# Patient Record
(demographics unavailable — no encounter records)

---

## 2024-11-01 NOTE — PHYSICAL EXAM
[Left] : left foot and ankle [NL (40)] : plantar flexion 40 degrees [NL 30)] : inversion 30 degrees [NL (20)] : eversion 20 degrees [5___] : eversion 5[unfilled]/5 [2+] : dorsalis pedis pulse: 2+ [] : no peroneal tendon subluxation with circumduction [TWNoteComboBox7] : dorsiflexion 0 degrees

## 2024-11-01 NOTE — DISCUSSION/SUMMARY
[de-identified] : Patient seen and examined.  Patient presents today with chronic acute on chronic left ankle pain.  Based on history, physical examination, imaging I discussed with her that her symptoms are suggestive of some ankle synovitis with associated talar osteophyte as well as a medial osteochondral lesion of the talus.  Due to the fact the patient has not really had any formal treatment to address this issue I recommended initial treatment of cam boot immobilization and nonsteroidal anti-inflammatories compression sock and plantar fascia night splint to help control ankle position.  I will see her back in approximately 2 weeks time.  Should her symptoms fail to improve or worsen we will maintain the cam boot for another 2 weeks and reassess.  Should her symptoms progress appropriately we will initiate transition to ASO and start physical therapy focusing on functional ankle rehabilitation.  Should her symptoms not improve over the next 6 weeks I will order an MRI to look at the extent of the pathology and soft tissue and cartilage to discuss further treatment options either injection or surgery.  Plan for next visit: 1.  Assess tenderness along anterior anterior lateral gutter syndesmosis as well as subfibular impingement.  Assess ankle instability she is currently has a lax anterior drawer.  Consider transitioning out of cam boot if symptoms are improving consider maintaining cam boot if they are not. **** Patient seen and examined.  Patient presents today following up for acute on chronic recurrent left ankle pain with subfibular impingement.  She is doing well on examination.  Her symptoms seem to be much improved.  Swelling is gone down tenderness is gone down.  Ankle feels more stable on examination.  I discussed with her that we will transition to an ASO, and start functional ankle rehabilitation with no passive or active inversion for the next 4 weeks.  I will plan to see her back in approximately 4 weeks time.  She should continue to use either the night splint or the cam boot at night.  No running cutting jumping or box jumps or leg press over the next 4 weeks.  We did discuss exercises I am comfortable with her doing.  Should her symptoms fail to improve or worsen over the next 4 weeks I will order an MRI to look at the extent of pathology and soft tissue and cartilage.  Did discuss further treatment options either injection or surgery.  Plan for next visit: Assess anterior lateral gutter syndesmosis pain as well as subfibular impingement although are improving today.  Assess ankle drawer, currently improving compared to prior examination.  Consider removing restriction on PT versus advanced imaging depending on her symptoms. *** Patient seen and examined.  Patient presents today following up for acute on chronic recurrent left ankle pain with subfibular impingement.  She is doing well on examination. Pt has improving exam but  in the anterior capsule. She has some mild anterior impingement and OLT of medial talar dome that is not tender on exam. Recommend MR so we can discuss further treatment options in the form of either injections biologic or csi were discussed v further PT and NSAIDs. All questions asked and answered. Pt agrees with plan.  *** Patient seen and examined.  Patient presents today for MRI review.  We discussed with her that on her MRI there is evidence of a medial osteochondral lesion of the talus, there is significant ankle synovitis, there is also attenuation of the anterior lateral bundle of the ATFL.  I discussed with her treatment options that included either a low-dose steroid injection to help calm down synovitis, and a PRP injection to do similarly calm down inflammation and help with regenerative potential of intra-articular ligaments as well as any cartilage pathology.  Additionally we did discuss surgical intervention surgical intervention includes an ankle arthroscopy, extensive debridement, assessment under anesthesia of ankle stability, possible Brostrom versus lateral ligament reconstruction, possible debridement of osteochondral lesion of the talus, harvesting B MAC, harvesting bone marrow autograft, placement of bone marrow autograft into the debrided cyst followed by bio cartilage and fibrin glue sealing it.  I discussed with her the possible ability to do these ALCAR as well if she wanted to start with a less invasive surgery to begin with.  We went over the rehab protocol for all with debridement surgery being weightbearing as tolerated within 2 to 3 weeks versus a Brostrom would be around 4 weeks starting physical therapy and brace followed by bio cartilage which would be approximately 6 weeks of nonweightbearing walking in normal shoe around 3 months.  Patient will consider her options and will plan to follow-up with me either to undergo an injection versus surgical discussion.  All questions were asked and answered.  Patient is agreement with the plan we will plan to follow-up accordingly. *** 3/25 Patient seen and examined.  Patient presents today following up for acute on chronic recurrent left ankle pain with subfibular impingement.  She is doing well on examination. Pt received low dose CSI L eft ankle ( did not want PRP, not interested in surgery at this time) tolerated procedure well Continue PT Continue Mobic RTC 6 weeks  next visit: If no improvement rediscuss sx options ** 5/20 Patient seen and examined.  Patient presents today following up for acute on chronic recurrent left ankle pain with subfibular impingement.  She is doing well on examination. Still endorsing pain along medial gutter, but lateral and anterolateral gutter  She received 95-99% pain improvement from injection but it is wearing off Pt cannot have surgery until end of september-- plan for ankle arthroscopy, debridement, repeat MR prior to surgery to assess change in cartilage defect. d/w pt possible repeat low dose csi in 5-6 weeks to help her be active over summer Continue PT Continue Mobic RTC 6 weeks  next visit: possible low dose csi (95% improvement last time) has to push surgery to fall *** 7/1 Patient seen and examined.  Patient presents today following up for acute on chronic recurrent left ankle pain with subfibular impingement.  She is doing well on examination. Still endorsing pain along medial gutter, but lateral and anterolateral gutter  She received 95-99% pain improvement from injection but it is wearing off Pt cannot have surgery until end of september-- plan for ankle arthroscopy, debridement, repeat MR prior to surgery to assess change in cartilage defect. repeated low dose csi to help her be active over summer, pt tolerated procedure well Continue PT Continue Mobic RTC 6 weeks  next visit: r/b of surgery if no improvement **** 11/1 Patient is here for discussion of timeline for surgery. I discussed with the patient that this surgery is completely elective and therefore the timeline is dependent and was most convenient for them.   I discussed with the patient based on their history, physical examination, imaging that her symptoms are suggestive of a symptomatic osteochondral lesion of the talus and possibly osteochondral lesion of the tibia. I discussed with her that she also has a talar osteophyte that is causing anterior impingement as well as a distal tibia osteophyte.    I discussed with her that due to the fact that she has failed nonoperative management my plan for surgery would be an arthroscopic procedure to debride the ankle joint of the synovitis, debridement of the diseased cartilage, assessment of the subchondral bone, if it is soft and there is evidence of cyst removed cystic contents, harvest BMAC and iliac crest bone graft and placement of bone graft into void from cyst removal, on top of that we I will place bio cartilage as well as fibrin glue to seal the defect. Additionally we discussed debriding and cleaning up margins possible microfracture of either the talus or talus and tibia in addition to what was listed above.    As an adjunct to this we also discussed potentially addressing lateral ligament pathology.   We discussed that that is dependent on her symptoms the day of surgery. We discussed the outline for treatment options include either tenosynovectomy, debridement, tubularization, side-to-side tenodesis with excision of the diseased tendon. I discussed with her at length the risks and benefits of surgery. Those include bleeding, neurovascular injury, injury to SPN, CRPS, potential risk of DVT PE and possible loss of life. Additionally we discussed that any cartilage procedure there is always a risk of secondary surgery needed. I discussed with her postoperative protocol that she will be nonweightbearing for approximately 6 weeks. The first 2 weeks will be in the splint followed by 4 weeks in a boot. As long as the incisions look well-healed at 2 weeks we will initiate range of motion to start at 2 weeks postop. Plan for her to start the crutch wean protocol at week 6 followed by the boot wean protocol at week 8. All questions were asked and answered. I would like to see the patient back the week of surgery so we can go over preop visit 1 more time and discuss all risks and benefits and any outstanding questions that were not covered today. Additionally that visit will serve as her preoperative medical clearance due to the fact that she is otherwise healthy denies any chest pain shortness of breath or taking any other medications that would necessitate formal preoperative clearance.

## 2024-11-01 NOTE — ASSESSMENT
[FreeTextEntry1] :   Problem list: 1.Chronic acute on chronic left ankle pain 2.  Osteochondral lesion of the talus 3.  Synovitis of the ankle 4.  Laxity of anterior lateral ligaments.

## 2024-11-01 NOTE — HISTORY OF PRESENT ILLNESS
[de-identified] : Date of Injury/Onset: Patient is here today for evaluation of left ankle. Patient has a history of aggravated fracture in the left ankle from a decade ago. Patient was placed in a cast and physical therapy but states since the original incident she has never been fully healed. Patient has seen other doctors after that and was told she had arthritis. Patient reports persistent swelling and pain after extended periods of walking. Pain: At Rest:  4-5 With Activity: 7 Mechanism of injury: This is NOT a Work Related Injury being treated under Worker's Compensation. This is NOT an athletic injury occurring associated with an interscholastic or organized sports team. Quality of symptoms: Throbbing pain Improves with: Nothing helps Worse with: Walking, physical activity Prior treatment: Cast, Physical Therapy Prior Imaging: X-RAY years ago Reports Available For Review Today: Out of work/sport: No School/Sport/Position/Occupation: [employer] Personal goal: Additional Information: Patient seen and examined.  Patient presents today for evaluation of left ankle pain.  Is acute on chronic.  She states she has an old injury she was diagnosed with some fracture but she is never received formal treatment no surgery no casting she states that she denies any trauma or injury initially.  She noticed her ankle was swollen after a party but states she cannot remember what happened and does not know what happened.  She states pain is localized to the anterior aspect of the ankle as well as into the lateral gutter.  She states that it is significant interfering with her ability perform ADLs or recreational activities.  She has not been seen or evaluated by other docs had any formal treatment, therapy injections or otherwise.  She is here for further evaluation management.  Denies any significant past medical history of diabetes etc.  1/29/24: Left ankle follow up.  Pt reports she feels better.  She states she does not have daily pain with walking.  She states that she feels like she has not tried to push it however she is noticing an improvement with her swelling and pain.  She is here for further evaluation management.  Denies interval trauma or injury.  02/26/2024 Patient is here for follow up of the Left Ankle, states still having aggravating pain on the Left Ankle and states she describes the ASO brace aggravtes her Left Ankle more. As well states she is doing Physical Therpary for Left Ankle. Inconsistently compliant with night splint / boot. Ran out of Mobic. State PT has flared it up.    03/14/2024: Pt is here today to follow up on left ankle MRI results. Patient reports no changes in her ankle despite continued PT, patient still experiences significant pain. Pt was unable to  the mobic.    03/25/2024: Pt is here today to follow up on left ankle. Patient has continued use of ASO and physical therapy but no changes in her ankle since last visit. Patient is interested in recieving an injection today.  05/20/2024 MOMO 40 year F is here today to follow up on left ankle. Patient reports significant improvement since last visit, especially after CSI.   07/01/2024 MOMO 40 year F is here today for follow up evaluation. Pt states that the prior injection is wearing off but that she definitely feels better than she did prior to having. Pt is unable to get surgery sooner than the fall and would like to have an injection today for pain control purposes since she has multiple trips lined up this summer and cannot take the time off to recover.   11/1/24: Patient is here to follow up on her left ankle. No significant improvemen since last visit. Here for preop discussion.

## 2024-11-01 NOTE — DATA REVIEWED
[FreeTextEntry1] : 3 views left ankle performed at [place]: These films independently interpreted by myself.  They are negative for fracture or dislocation.  There is evidence of some mild osteophyte along the talar neck.  There is evidence of an osteochondral lesion of the talus.  There is adequate joint space appreciated in tibiotalar subtalar joint.  No intra-articular loose bodies appreciated.  MR left ankle: Attenuation of atfl Stable 12x6 mm OLT synovitis

## 2025-01-23 NOTE — PHYSICAL EXAM
[Left] : left foot and ankle [5___] : plantar flexion 5[unfilled]/5 [] : no peroneal tendon tenderness [TWNoteComboBox7] : dorsiflexion 0 degrees [de-identified] : LIMITED ANKLE EXAM: 3 incisions healing well , AM AL portal and lateral incision no evidence of discharge or drainage DF PF 0-40 SILT DP SP T S S able to wiggle toes

## 2025-01-23 NOTE — DISCUSSION/SUMMARY
[de-identified] : Patient seen and examined.  Patient presents today with chronic acute on chronic left ankle pain.  Based on history, physical examination, imaging I discussed with her that her symptoms are suggestive of some ankle synovitis with associated talar osteophyte as well as a medial osteochondral lesion of the talus.  Due to the fact the patient has not really had any formal treatment to address this issue I recommended initial treatment of cam boot immobilization and nonsteroidal anti-inflammatories compression sock and plantar fascia night splint to help control ankle position.  I will see her back in approximately 2 weeks time.  Should her symptoms fail to improve or worsen we will maintain the cam boot for another 2 weeks and reassess.  Should her symptoms progress appropriately we will initiate transition to ASO and start physical therapy focusing on functional ankle rehabilitation.  Should her symptoms not improve over the next 6 weeks I will order an MRI to look at the extent of the pathology and soft tissue and cartilage to discuss further treatment options either injection or surgery.  Plan for next visit: 1.  Assess tenderness along anterior anterior lateral gutter syndesmosis as well as subfibular impingement.  Assess ankle instability she is currently has a lax anterior drawer.  Consider transitioning out of cam boot if symptoms are improving consider maintaining cam boot if they are not. **** Patient seen and examined.  Patient presents today following up for acute on chronic recurrent left ankle pain with subfibular impingement.  She is doing well on examination.  Her symptoms seem to be much improved.  Swelling is gone down tenderness is gone down.  Ankle feels more stable on examination.  I discussed with her that we will transition to an ASO, and start functional ankle rehabilitation with no passive or active inversion for the next 4 weeks.  I will plan to see her back in approximately 4 weeks time.  She should continue to use either the night splint or the cam boot at night.  No running cutting jumping or box jumps or leg press over the next 4 weeks.  We did discuss exercises I am comfortable with her doing.  Should her symptoms fail to improve or worsen over the next 4 weeks I will order an MRI to look at the extent of pathology and soft tissue and cartilage.  Did discuss further treatment options either injection or surgery.  Plan for next visit: Assess anterior lateral gutter syndesmosis pain as well as subfibular impingement although are improving today.  Assess ankle drawer, currently improving compared to prior examination.  Consider removing restriction on PT versus advanced imaging depending on her symptoms. *** Patient seen and examined.  Patient presents today following up for acute on chronic recurrent left ankle pain with subfibular impingement.  She is doing well on examination. Pt has improving exam but  in the anterior capsule. She has some mild anterior impingement and OLT of medial talar dome that is not tender on exam. Recommend MR so we can discuss further treatment options in the form of either injections biologic or csi were discussed v further PT and NSAIDs. All questions asked and answered. Pt agrees with plan.  *** Patient seen and examined.  Patient presents today for MRI review.  We discussed with her that on her MRI there is evidence of a medial osteochondral lesion of the talus, there is significant ankle synovitis, there is also attenuation of the anterior lateral bundle of the ATFL.  I discussed with her treatment options that included either a low-dose steroid injection to help calm down synovitis, and a PRP injection to do similarly calm down inflammation and help with regenerative potential of intra-articular ligaments as well as any cartilage pathology.  Additionally we did discuss surgical intervention surgical intervention includes an ankle arthroscopy, extensive debridement, assessment under anesthesia of ankle stability, possible Brostrom versus lateral ligament reconstruction, possible debridement of osteochondral lesion of the talus, harvesting B MAC, harvesting bone marrow autograft, placement of bone marrow autograft into the debrided cyst followed by bio cartilage and fibrin glue sealing it.  I discussed with her the possible ability to do these ALCAR as well if she wanted to start with a less invasive surgery to begin with.  We went over the rehab protocol for all with debridement surgery being weightbearing as tolerated within 2 to 3 weeks versus a Brostrom would be around 4 weeks starting physical therapy and brace followed by bio cartilage which would be approximately 6 weeks of nonweightbearing walking in normal shoe around 3 months.  Patient will consider her options and will plan to follow-up with me either to undergo an injection versus surgical discussion.  All questions were asked and answered.  Patient is agreement with the plan we will plan to follow-up accordingly. *** 3/25 Patient seen and examined.  Patient presents today following up for acute on chronic recurrent left ankle pain with subfibular impingement.  She is doing well on examination. Pt received low dose CSI L eft ankle ( did not want PRP, not interested in surgery at this time) tolerated procedure well Continue PT Continue Mobic RTC 6 weeks  next visit: If no improvement rediscuss sx options ** 5/20 Patient seen and examined.  Patient presents today following up for acute on chronic recurrent left ankle pain with subfibular impingement.  She is doing well on examination. Still endorsing pain along medial gutter, but lateral and anterolateral gutter  She received 95-99% pain improvement from injection but it is wearing off Pt cannot have surgery until end of september-- plan for ankle arthroscopy, debridement, repeat MR prior to surgery to assess change in cartilage defect. d/w pt possible repeat low dose csi in 5-6 weeks to help her be active over summer Continue PT Continue Mobic RTC 6 weeks  next visit: possible low dose csi (95% improvement last time) has to push surgery to fall *** 7/1 Patient seen and examined.  Patient presents today following up for acute on chronic recurrent left ankle pain with subfibular impingement.  She is doing well on examination. Still endorsing pain along medial gutter, but lateral and anterolateral gutter  She received 95-99% pain improvement from injection but it is wearing off Pt cannot have surgery until end of september-- plan for ankle arthroscopy, debridement, repeat MR prior to surgery to assess change in cartilage defect. repeated low dose csi to help her be active over summer, pt tolerated procedure well Continue PT Continue Mobic RTC 6 weeks  next visit: r/b of surgery if no improvement **** 11/1 Patient is here for discussion of timeline for surgery. I discussed with the patient that this surgery is completely elective and therefore the timeline is dependent and was most convenient for them.   I discussed with the patient based on their history, physical examination, imaging that her symptoms are suggestive of a symptomatic osteochondral lesion of the talus and possibly osteochondral lesion of the tibia. I discussed with her that she also has a talar osteophyte that is causing anterior impingement as well as a distal tibia osteophyte.    I discussed with her that due to the fact that she has failed nonoperative management my plan for surgery would be an arthroscopic procedure to debride the ankle joint of the synovitis, debridement of the diseased cartilage, assessment of the subchondral bone, if it is soft and there is evidence of cyst removed cystic contents, harvest BMAC and iliac crest bone graft and placement of bone graft into void from cyst removal, on top of that we I will place bio cartilage as well as fibrin glue to seal the defect. Additionally we discussed debriding and cleaning up margins possible microfracture of either the talus or talus and tibia in addition to what was listed above.    As an adjunct to this we also discussed potentially addressing lateral ligament pathology.   We discussed that that is dependent on her symptoms the day of surgery. We discussed the outline for treatment options include either tenosynovectomy, debridement, tubularization, side-to-side tenodesis with excision of the diseased tendon. I discussed with her at length the risks and benefits of surgery. Those include bleeding, neurovascular injury, injury to SPN, CRPS, potential risk of DVT PE and possible loss of life. Additionally we discussed that any cartilage procedure there is always a risk of secondary surgery needed. I discussed with her postoperative protocol that she will be nonweightbearing for approximately 6 weeks. The first 2 weeks will be in the splint followed by 4 weeks in a boot. As long as the incisions look well-healed at 2 weeks we will initiate range of motion to start at 2 weeks postop. Plan for her to start the crutch wean protocol at week 6 followed by the boot wean protocol at week 8. All questions were asked and answered. I would like to see the patient back the week of surgery so we can go over preop visit 1 more time and discuss all risks and benefits and any outstanding questions that were not covered today. Additionally that visit will serve as her preoperative medical clearance due to the fact that she is otherwise healthy denies any chest pain shortness of breath or taking any other medications that would necessitate formal preoperative clearance. *** POV 1 L ankle arthroscopy, debridement, microfx,brostrom incisions healing well , no concern for infection splint removed, cast applied suture removed steristrips applied continue dvt ppx nwb for another 4 weeks RTC 2 weeks  next visit: dc cast transition into boot start PT no wb for 6 weeks total

## 2025-01-23 NOTE — HISTORY OF PRESENT ILLNESS
[de-identified] : Date of Injury/Onset: Patient is here today for evaluation of left ankle. Patient has a history of aggravated fracture in the left ankle from a decade ago. Patient was placed in a cast and physical therapy but states since the original incident she has never been fully healed. Patient has seen other doctors after that and was told she had arthritis. Patient reports persistent swelling and pain after extended periods of walking. Pain: At Rest:  4-5 With Activity: 7 Mechanism of injury: This is NOT a Work Related Injury being treated under Worker's Compensation. This is NOT an athletic injury occurring associated with an interscholastic or organized sports team. Quality of symptoms: Throbbing pain Improves with: Nothing helps Worse with: Walking, physical activity Prior treatment: Cast, Physical Therapy Prior Imaging: X-RAY years ago Reports Available For Review Today: Out of work/sport: No School/Sport/Position/Occupation: [employer] Personal goal: Additional Information: Patient seen and examined.  Patient presents today for evaluation of left ankle pain.  Is acute on chronic.  She states she has an old injury she was diagnosed with some fracture but she is never received formal treatment no surgery no casting she states that she denies any trauma or injury initially.  She noticed her ankle was swollen after a party but states she cannot remember what happened and does not know what happened.  She states pain is localized to the anterior aspect of the ankle as well as into the lateral gutter.  She states that it is significant interfering with her ability perform ADLs or recreational activities.  She has not been seen or evaluated by other docs had any formal treatment, therapy injections or otherwise.  She is here for further evaluation management.  Denies any significant past medical history of diabetes etc.  1/29/24: Left ankle follow up.  Pt reports she feels better.  She states she does not have daily pain with walking.  She states that she feels like she has not tried to push it however she is noticing an improvement with her swelling and pain.  She is here for further evaluation management.  Denies interval trauma or injury.  02/26/2024 Patient is here for follow up of the Left Ankle, states still having aggravating pain on the Left Ankle and states she describes the ASO brace aggravates her Left Ankle more. As well states she is doing Physical Therapy for Left Ankle. Inconsistently compliant with night splint / boot. Ran out of Mobic. State PT has flared it up.    03/14/2024: Pt is here today to follow up on left ankle MRI results. Patient reports no changes in her ankle despite continued PT, patient still experiences significant pain. Pt was unable to  the Mobic.    03/25/2024: Pt is here today to follow up on left ankle. Patient has continued use of ASO and physical therapy but no changes in her ankle since last visit. Patient is interested in receiving an injection today.  05/20/2024 MOMO 40 year F is here today to follow up on left ankle. Patient reports significant improvement since last visit, especially after CSI.   07/01/2024 MOMO 40 year F is here today for follow up evaluation. Pt states that the prior injection is wearing off but that she definitely feels better than she did prior to having. Pt is unable to get surgery sooner than the fall and would like to have an injection today for pain control purposes since she has multiple trips lined up this summer and cannot take the time off to recover.   11/1/24: Patient is here to follow up on her left ankle. No significant improvement since last visit. Here for preop discussion.   01/23/2025 MOMO  is here today for post op visit #1. Patient denies chest pain/ fever after surgery. Pain level (0/10). Patient had been compliant with post op care and NWB. Patient had been taking aspirin.

## 2025-01-23 NOTE — DATA REVIEWED
[FreeTextEntry1] : 3 views left ankle performed at [facility]: These films independently interpreted by myself.  They are negative for fracture or dislocation.  There is evidence of some mild osteophyte along the talar neck.  There is evidence of an osteochondral lesion of the talus.  There is adequate joint space appreciated in tibiotalar subtalar joint.  No intra-articular loose bodies appreciated.  MR left ankle: Attenuation of atfl Stable 12x6 mm OLT synovitis

## 2025-02-06 NOTE — PHYSICAL EXAM
[Left] : left foot and ankle [5___] : plantar flexion 5[unfilled]/5 [] : no peroneal tendon tenderness [de-identified] : LIMITED ANKLE EXAM: 3 incisions healing well , AM AL portal and lateral incision no evidence of discharge or drainage DF PF 0-40 SILT DP SP T S S able to wiggle toes

## 2025-02-06 NOTE — DISCUSSION/SUMMARY
[de-identified] : Patient seen and examined.  Patient presents today with chronic acute on chronic left ankle pain.  Based on history, physical examination, imaging I discussed with her that her symptoms are suggestive of some ankle synovitis with associated talar osteophyte as well as a medial osteochondral lesion of the talus.  Due to the fact the patient has not really had any formal treatment to address this issue I recommended initial treatment of cam boot immobilization and nonsteroidal anti-inflammatories compression sock and plantar fascia night splint to help control ankle position.  I will see her back in approximately 2 weeks time.  Should her symptoms fail to improve or worsen we will maintain the cam boot for another 2 weeks and reassess.  Should her symptoms progress appropriately we will initiate transition to ASO and start physical therapy focusing on functional ankle rehabilitation.  Should her symptoms not improve over the next 6 weeks I will order an MRI to look at the extent of the pathology and soft tissue and cartilage to discuss further treatment options either injection or surgery.  Plan for next visit: 1.  Assess tenderness along anterior anterior lateral gutter syndesmosis as well as subfibular impingement.  Assess ankle instability she is currently has a lax anterior drawer.  Consider transitioning out of cam boot if symptoms are improving consider maintaining cam boot if they are not. **** Patient seen and examined.  Patient presents today following up for acute on chronic recurrent left ankle pain with subfibular impingement.  She is doing well on examination.  Her symptoms seem to be much improved.  Swelling is gone down tenderness is gone down.  Ankle feels more stable on examination.  I discussed with her that we will transition to an ASO, and start functional ankle rehabilitation with no passive or active inversion for the next 4 weeks.  I will plan to see her back in approximately 4 weeks time.  She should continue to use either the night splint or the cam boot at night.  No running cutting jumping or box jumps or leg press over the next 4 weeks.  We did discuss exercises I am comfortable with her doing.  Should her symptoms fail to improve or worsen over the next 4 weeks I will order an MRI to look at the extent of pathology and soft tissue and cartilage.  Did discuss further treatment options either injection or surgery.  Plan for next visit: Assess anterior lateral gutter syndesmosis pain as well as subfibular impingement although are improving today.  Assess ankle drawer, currently improving compared to prior examination.  Consider removing restriction on PT versus advanced imaging depending on her symptoms. *** Patient seen and examined.  Patient presents today following up for acute on chronic recurrent left ankle pain with subfibular impingement.  She is doing well on examination. Pt has improving exam but  in the anterior capsule. She has some mild anterior impingement and OLT of medial talar dome that is not tender on exam. Recommend MR so we can discuss further treatment options in the form of either injections biologic or csi were discussed v further PT and NSAIDs. All questions asked and answered. Pt agrees with plan.  *** Patient seen and examined.  Patient presents today for MRI review.  We discussed with her that on her MRI there is evidence of a medial osteochondral lesion of the talus, there is significant ankle synovitis, there is also attenuation of the anterior lateral bundle of the ATFL.  I discussed with her treatment options that included either a low-dose steroid injection to help calm down synovitis, and a PRP injection to do similarly calm down inflammation and help with regenerative potential of intra-articular ligaments as well as any cartilage pathology.  Additionally we did discuss surgical intervention surgical intervention includes an ankle arthroscopy, extensive debridement, assessment under anesthesia of ankle stability, possible Brostrom versus lateral ligament reconstruction, possible debridement of osteochondral lesion of the talus, harvesting B MAC, harvesting bone marrow autograft, placement of bone marrow autograft into the debrided cyst followed by bio cartilage and fibrin glue sealing it.  I discussed with her the possible ability to do these ALCAR as well if she wanted to start with a less invasive surgery to begin with.  We went over the rehab protocol for all with debridement surgery being weightbearing as tolerated within 2 to 3 weeks versus a Brostrom would be around 4 weeks starting physical therapy and brace followed by bio cartilage which would be approximately 6 weeks of nonweightbearing walking in normal shoe around 3 months.  Patient will consider her options and will plan to follow-up with me either to undergo an injection versus surgical discussion.  All questions were asked and answered.  Patient is agreement with the plan we will plan to follow-up accordingly. *** 3/25 Patient seen and examined.  Patient presents today following up for acute on chronic recurrent left ankle pain with subfibular impingement.  She is doing well on examination. Pt received low dose CSI L eft ankle ( did not want PRP, not interested in surgery at this time) tolerated procedure well Continue PT Continue Mobic RTC 6 weeks  next visit: If no improvement rediscuss sx options ** 5/20 Patient seen and examined.  Patient presents today following up for acute on chronic recurrent left ankle pain with subfibular impingement.  She is doing well on examination. Still endorsing pain along medial gutter, but lateral and anterolateral gutter  She received 95-99% pain improvement from injection but it is wearing off Pt cannot have surgery until end of september-- plan for ankle arthroscopy, debridement, repeat MR prior to surgery to assess change in cartilage defect. d/w pt possible repeat low dose csi in 5-6 weeks to help her be active over summer Continue PT Continue Mobic RTC 6 weeks  next visit: possible low dose csi (95% improvement last time) has to push surgery to fall *** 7/1 Patient seen and examined.  Patient presents today following up for acute on chronic recurrent left ankle pain with subfibular impingement.  She is doing well on examination. Still endorsing pain along medial gutter, but lateral and anterolateral gutter  She received 95-99% pain improvement from injection but it is wearing off Pt cannot have surgery until end of september-- plan for ankle arthroscopy, debridement, repeat MR prior to surgery to assess change in cartilage defect. repeated low dose csi to help her be active over summer, pt tolerated procedure well Continue PT Continue Mobic RTC 6 weeks  next visit: r/b of surgery if no improvement **** 11/1 Patient is here for discussion of timeline for surgery. I discussed with the patient that this surgery is completely elective and therefore the timeline is dependent and was most convenient for them.   I discussed with the patient based on their history, physical examination, imaging that her symptoms are suggestive of a symptomatic osteochondral lesion of the talus and possibly osteochondral lesion of the tibia. I discussed with her that she also has a talar osteophyte that is causing anterior impingement as well as a distal tibia osteophyte.    I discussed with her that due to the fact that she has failed nonoperative management my plan for surgery would be an arthroscopic procedure to debride the ankle joint of the synovitis, debridement of the diseased cartilage, assessment of the subchondral bone, if it is soft and there is evidence of cyst removed cystic contents, harvest BMAC and iliac crest bone graft and placement of bone graft into void from cyst removal, on top of that we I will place bio cartilage as well as fibrin glue to seal the defect. Additionally we discussed debriding and cleaning up margins possible microfracture of either the talus or talus and tibia in addition to what was listed above.    As an adjunct to this we also discussed potentially addressing lateral ligament pathology.   We discussed that that is dependent on her symptoms the day of surgery. We discussed the outline for treatment options include either tenosynovectomy, debridement, tubularization, side-to-side tenodesis with excision of the diseased tendon. I discussed with her at length the risks and benefits of surgery. Those include bleeding, neurovascular injury, injury to SPN, CRPS, potential risk of DVT PE and possible loss of life. Additionally we discussed that any cartilage procedure there is always a risk of secondary surgery needed. I discussed with her postoperative protocol that she will be nonweightbearing for approximately 6 weeks. The first 2 weeks will be in the splint followed by 4 weeks in a boot. As long as the incisions look well-healed at 2 weeks we will initiate range of motion to start at 2 weeks postop. Plan for her to start the crutch wean protocol at week 6 followed by the boot wean protocol at week 8. All questions were asked and answered. I would like to see the patient back the week of surgery so we can go over preop visit 1 more time and discuss all risks and benefits and any outstanding questions that were not covered today. Additionally that visit will serve as her preoperative medical clearance due to the fact that she is otherwise healthy denies any chest pain shortness of breath or taking any other medications that would necessitate formal preoperative clearance. *** POV 1 L ankle arthroscopy, debridement, microfx,brostrom incisions healing well , no concern for infection splint removed, cast applied suture removed steristrips applied continue dvt ppx nwb for another 4 weeks RTC 2 weeks  next visit: dc cast transition into boot start PT no wb for 6 weeks total *** pov 2 POV 2 L ankle arthroscopy, debridement, microfx,brostrom incisions healing well , no concern for infection CAST removed, cast applied GENTLE ROM DF PF NO INV EV nwb for another 2 weeks RTC 2 weeks  next visit: CRUTCH WEEN start PT

## 2025-02-06 NOTE — HISTORY OF PRESENT ILLNESS
[de-identified] : Date of Injury/Onset: Patient is here today for evaluation of left ankle. Patient has a history of aggravated fracture in the left ankle from a decade ago. Patient was placed in a cast and physical therapy but states since the original incident she has never been fully healed. Patient has seen other doctors after that and was told she had arthritis. Patient reports persistent swelling and pain after extended periods of walking. Pain: At Rest:  4-5 With Activity: 7 Mechanism of injury: This is NOT a Work Related Injury being treated under Worker's Compensation. This is NOT an athletic injury occurring associated with an interscholastic or organized sports team. Quality of symptoms: Throbbing pain Improves with: Nothing helps Worse with: Walking, physical activity Prior treatment: Cast, Physical Therapy Prior Imaging: X-RAY years ago Reports Available For Review Today: Out of work/sport: No School/Sport/Position/Occupation: [employer] Personal goal: Additional Information: Patient seen and examined.  Patient presents today for evaluation of left ankle pain.  Is acute on chronic.  She states she has an old injury she was diagnosed with some fracture but she is never received formal treatment no surgery no casting she states that she denies any trauma or injury initially.  She noticed her ankle was swollen after a party but states she cannot remember what happened and does not know what happened.  She states pain is localized to the anterior aspect of the ankle as well as into the lateral gutter.  She states that it is significant interfering with her ability perform ADLs or recreational activities.  She has not been seen or evaluated by other docs had any formal treatment, therapy injections or otherwise.  She is here for further evaluation management.  Denies any significant past medical history of diabetes etc.  1/29/24: Left ankle follow up.  Pt reports she feels better.  She states she does not have daily pain with walking.  She states that she feels like she has not tried to push it however she is noticing an improvement with her swelling and pain.  She is here for further evaluation management.  Denies interval trauma or injury.  02/26/2024 Patient is here for follow up of the Left Ankle, states still having aggravating pain on the Left Ankle and states she describes the ASO brace aggravates her Left Ankle more. As well states she is doing Physical Therapy for Left Ankle. Inconsistently compliant with night splint / boot. Ran out of Mobic. State PT has flared it up.    03/14/2024: Pt is here today to follow up on left ankle MRI results. Patient reports no changes in her ankle despite continued PT, patient still experiences significant pain. Pt was unable to  the Mobic.    03/25/2024: Pt is here today to follow up on left ankle. Patient has continued use of ASO and physical therapy but no changes in her ankle since last visit. Patient is interested in receiving an injection today.  05/20/2024 MOMO 40 year F is here today to follow up on left ankle. Patient reports significant improvement since last visit, especially after CSI.   07/01/2024 MOMO 40 year F is here today for follow up evaluation. Pt states that the prior injection is wearing off but that she definitely feels better than she did prior to having. Pt is unable to get surgery sooner than the fall and would like to have an injection today for pain control purposes since she has multiple trips lined up this summer and cannot take the time off to recover.   11/1/24: Patient is here to follow up on her left ankle. No significant improvement since last visit. Here for preop discussion.   01/23/2025 MOMO  is here today for post op visit #1. Patient denies chest pain/ fever after surgery. Pain level (0/10). Patient had been compliant with post op care and NWB. Patient had been taking aspirin.  02/06/2025 MOMO  is here today for post op visit #2. Patient had been taking aspirin and Tylenol for pain relief. Patient had been compliant wiht post op care, NWB, she is ambulating in a wheelchair.

## 2025-02-06 NOTE — DATA REVIEWED
[FreeTextEntry1] : 3 views left ankle performed at [facility]: These films independently interpreted by myself.  They are negative for fracture or dislocation.  There is evidence of some mild osteophyte along the talar neck.  There is evidence of an osteochondral lesion of the talus.  There is adequate joint space appreciated in tibiotalar subtalar joint.  No intra-articular loose bodies appreciated.  MR left ankle: Attenuation of atfl Stable 12x6 mm OLT synovitis

## 2025-02-20 NOTE — DISCUSSION/SUMMARY
[de-identified] : Patient seen and examined.  Patient presents today with chronic acute on chronic left ankle pain.  Based on history, physical examination, imaging I discussed with her that her symptoms are suggestive of some ankle synovitis with associated talar osteophyte as well as a medial osteochondral lesion of the talus.  Due to the fact the patient has not really had any formal treatment to address this issue I recommended initial treatment of cam boot immobilization and nonsteroidal anti-inflammatories compression sock and plantar fascia night splint to help control ankle position.  I will see her back in approximately 2 weeks time.  Should her symptoms fail to improve or worsen we will maintain the cam boot for another 2 weeks and reassess.  Should her symptoms progress appropriately we will initiate transition to ASO and start physical therapy focusing on functional ankle rehabilitation.  Should her symptoms not improve over the next 6 weeks I will order an MRI to look at the extent of the pathology and soft tissue and cartilage to discuss further treatment options either injection or surgery.  Plan for next visit: 1.  Assess tenderness along anterior anterior lateral gutter syndesmosis as well as subfibular impingement.  Assess ankle instability she is currently has a lax anterior drawer.  Consider transitioning out of cam boot if symptoms are improving consider maintaining cam boot if they are not. **** Patient seen and examined.  Patient presents today following up for acute on chronic recurrent left ankle pain with subfibular impingement.  She is doing well on examination.  Her symptoms seem to be much improved.  Swelling is gone down tenderness is gone down.  Ankle feels more stable on examination.  I discussed with her that we will transition to an ASO, and start functional ankle rehabilitation with no passive or active inversion for the next 4 weeks.  I will plan to see her back in approximately 4 weeks time.  She should continue to use either the night splint or the cam boot at night.  No running cutting jumping or box jumps or leg press over the next 4 weeks.  We did discuss exercises I am comfortable with her doing.  Should her symptoms fail to improve or worsen over the next 4 weeks I will order an MRI to look at the extent of pathology and soft tissue and cartilage.  Did discuss further treatment options either injection or surgery.  Plan for next visit: Assess anterior lateral gutter syndesmosis pain as well as subfibular impingement although are improving today.  Assess ankle drawer, currently improving compared to prior examination.  Consider removing restriction on PT versus advanced imaging depending on her symptoms. *** Patient seen and examined.  Patient presents today following up for acute on chronic recurrent left ankle pain with subfibular impingement.  She is doing well on examination. Pt has improving exam but  in the anterior capsule. She has some mild anterior impingement and OLT of medial talar dome that is not tender on exam. Recommend MR so we can discuss further treatment options in the form of either injections biologic or csi were discussed v further PT and NSAIDs. All questions asked and answered. Pt agrees with plan.  *** Patient seen and examined.  Patient presents today for MRI review.  We discussed with her that on her MRI there is evidence of a medial osteochondral lesion of the talus, there is significant ankle synovitis, there is also attenuation of the anterior lateral bundle of the ATFL.  I discussed with her treatment options that included either a low-dose steroid injection to help calm down synovitis, and a PRP injection to do similarly calm down inflammation and help with regenerative potential of intra-articular ligaments as well as any cartilage pathology.  Additionally we did discuss surgical intervention surgical intervention includes an ankle arthroscopy, extensive debridement, assessment under anesthesia of ankle stability, possible Brostrom versus lateral ligament reconstruction, possible debridement of osteochondral lesion of the talus, harvesting B MAC, harvesting bone marrow autograft, placement of bone marrow autograft into the debrided cyst followed by bio cartilage and fibrin glue sealing it.  I discussed with her the possible ability to do these ALCAR as well if she wanted to start with a less invasive surgery to begin with.  We went over the rehab protocol for all with debridement surgery being weightbearing as tolerated within 2 to 3 weeks versus a Brostrom would be around 4 weeks starting physical therapy and brace followed by bio cartilage which would be approximately 6 weeks of nonweightbearing walking in normal shoe around 3 months.  Patient will consider her options and will plan to follow-up with me either to undergo an injection versus surgical discussion.  All questions were asked and answered.  Patient is agreement with the plan we will plan to follow-up accordingly. *** 3/25 Patient seen and examined.  Patient presents today following up for acute on chronic recurrent left ankle pain with subfibular impingement.  She is doing well on examination. Pt received low dose CSI L eft ankle ( did not want PRP, not interested in surgery at this time) tolerated procedure well Continue PT Continue Mobic RTC 6 weeks  next visit: If no improvement rediscuss sx options ** 5/20 Patient seen and examined.  Patient presents today following up for acute on chronic recurrent left ankle pain with subfibular impingement.  She is doing well on examination. Still endorsing pain along medial gutter, but lateral and anterolateral gutter  She received 95-99% pain improvement from injection but it is wearing off Pt cannot have surgery until end of september-- plan for ankle arthroscopy, debridement, repeat MR prior to surgery to assess change in cartilage defect. d/w pt possible repeat low dose csi in 5-6 weeks to help her be active over summer Continue PT Continue Mobic RTC 6 weeks  next visit: possible low dose csi (95% improvement last time) has to push surgery to fall *** 7/1 Patient seen and examined.  Patient presents today following up for acute on chronic recurrent left ankle pain with subfibular impingement.  She is doing well on examination. Still endorsing pain along medial gutter, but lateral and anterolateral gutter  She received 95-99% pain improvement from injection but it is wearing off Pt cannot have surgery until end of september-- plan for ankle arthroscopy, debridement, repeat MR prior to surgery to assess change in cartilage defect. repeated low dose csi to help her be active over summer, pt tolerated procedure well Continue PT Continue Mobic RTC 6 weeks  next visit: r/b of surgery if no improvement **** 11/1 Patient is here for discussion of timeline for surgery. I discussed with the patient that this surgery is completely elective and therefore the timeline is dependent and was most convenient for them.   I discussed with the patient based on their history, physical examination, imaging that her symptoms are suggestive of a symptomatic osteochondral lesion of the talus and possibly osteochondral lesion of the tibia. I discussed with her that she also has a talar osteophyte that is causing anterior impingement as well as a distal tibia osteophyte.    I discussed with her that due to the fact that she has failed nonoperative management my plan for surgery would be an arthroscopic procedure to debride the ankle joint of the synovitis, debridement of the diseased cartilage, assessment of the subchondral bone, if it is soft and there is evidence of cyst removed cystic contents, harvest BMAC and iliac crest bone graft and placement of bone graft into void from cyst removal, on top of that we I will place bio cartilage as well as fibrin glue to seal the defect. Additionally we discussed debriding and cleaning up margins possible microfracture of either the talus or talus and tibia in addition to what was listed above.    As an adjunct to this we also discussed potentially addressing lateral ligament pathology.   We discussed that that is dependent on her symptoms the day of surgery. We discussed the outline for treatment options include either tenosynovectomy, debridement, tubularization, side-to-side tenodesis with excision of the diseased tendon. I discussed with her at length the risks and benefits of surgery. Those include bleeding, neurovascular injury, injury to SPN, CRPS, potential risk of DVT PE and possible loss of life. Additionally we discussed that any cartilage procedure there is always a risk of secondary surgery needed. I discussed with her postoperative protocol that she will be nonweightbearing for approximately 6 weeks. The first 2 weeks will be in the splint followed by 4 weeks in a boot. As long as the incisions look well-healed at 2 weeks we will initiate range of motion to start at 2 weeks postop. Plan for her to start the crutch wean protocol at week 6 followed by the boot wean protocol at week 8. All questions were asked and answered. I would like to see the patient back the week of surgery so we can go over preop visit 1 more time and discuss all risks and benefits and any outstanding questions that were not covered today. Additionally that visit will serve as her preoperative medical clearance due to the fact that she is otherwise healthy denies any chest pain shortness of breath or taking any other medications that would necessitate formal preoperative clearance. *** POV 1 L ankle arthroscopy, debridement, microfx,brostrom incisions healing well , no concern for infection splint removed, cast applied suture removed steristrips applied continue dvt ppx nwb for another 4 weeks RTC 2 weeks  next visit: dc cast transition into boot start PT no wb for 6 weeks total *** pov 2 POV 2 L ankle arthroscopy, debridement, microfx,brostrom incisions healing well , no concern for infection CAST removed, cast applied GENTLE ROM DF PF NO INV EV nwb for another 2 weeks RTC 2 weeks  next visit: CRUTCH WEEN start PT *** POV 3 L ankle arthroscopy, debridement, microfx,brostrom incisions healing well , no concern for infection boot removed, PT rx given no inversion 4 weeks crutch ween given nwb for another 2 weeks RTC 2 weeks  next visit: boot WEEN check rom

## 2025-02-20 NOTE — HISTORY OF PRESENT ILLNESS
[de-identified] : Date of Injury/Onset: Patient is here today for evaluation of left ankle. Patient has a history of aggravated fracture in the left ankle from a decade ago. Patient was placed in a cast and physical therapy but states since the original incident she has never been fully healed. Patient has seen other doctors after that and was told she had arthritis. Patient reports persistent swelling and pain after extended periods of walking. Pain: At Rest:  4-5 With Activity: 7 Mechanism of injury: This is NOT a Work Related Injury being treated under Worker's Compensation. This is NOT an athletic injury occurring associated with an interscholastic or organized sports team. Quality of symptoms: Throbbing pain Improves with: Nothing helps Worse with: Walking, physical activity Prior treatment: Cast, Physical Therapy Prior Imaging: X-RAY years ago Reports Available For Review Today: Out of work/sport: No School/Sport/Position/Occupation: [employer] Personal goal: Additional Information: Patient seen and examined.  Patient presents today for evaluation of left ankle pain.  Is acute on chronic.  She states she has an old injury she was diagnosed with some fracture but she is never received formal treatment no surgery no casting she states that she denies any trauma or injury initially.  She noticed her ankle was swollen after a party but states she cannot remember what happened and does not know what happened.  She states pain is localized to the anterior aspect of the ankle as well as into the lateral gutter.  She states that it is significant interfering with her ability perform ADLs or recreational activities.  She has not been seen or evaluated by other docs had any formal treatment, therapy injections or otherwise.  She is here for further evaluation management.  Denies any significant past medical history of diabetes etc.  1/29/24: Left ankle follow up.  Pt reports she feels better.  She states she does not have daily pain with walking.  She states that she feels like she has not tried to push it however she is noticing an improvement with her swelling and pain.  She is here for further evaluation management.  Denies interval trauma or injury.  02/26/2024 Patient is here for follow up of the Left Ankle, states still having aggravating pain on the Left Ankle and states she describes the ASO brace aggravates her Left Ankle more. As well states she is doing Physical Therapy for Left Ankle. Inconsistently compliant with night splint / boot. Ran out of Mobic. State PT has flared it up.    03/14/2024: Pt is here today to follow up on left ankle MRI results. Patient reports no changes in her ankle despite continued PT, patient still experiences significant pain. Pt was unable to  the Mobic.    03/25/2024: Pt is here today to follow up on left ankle. Patient has continued use of ASO and physical therapy but no changes in her ankle since last visit. Patient is interested in receiving an injection today.  05/20/2024 MOMO 40 year F is here today to follow up on left ankle. Patient reports significant improvement since last visit, especially after CSI.   07/01/2024 MOMO 40 year F is here today for follow up evaluation. Pt states that the prior injection is wearing off but that she definitely feels better than she did prior to having. Pt is unable to get surgery sooner than the fall and would like to have an injection today for pain control purposes since she has multiple trips lined up this summer and cannot take the time off to recover.   11/1/24: Patient is here to follow up on her left ankle. No significant improvement since last visit. Here for preop discussion.   01/23/2025 MOMO  is here today for post op visit #1. Patient denies chest pain/ fever after surgery. Pain level (0/10). Patient had been compliant with post op care and NWB. Patient had been taking aspirin.  02/06/2025 MOMO  is here today for post op visit #2. Patient had been taking aspirin and Tylenol for pain relief. Patient had been compliant wiht post op care, NWB, she is ambulating in a wheelchair.   02/20/2025 MOMO is Here today for follow up PO # 3. patient states improvement since last visit. patient is compliant with PO care. NWB is abbulating in a wheelchair.

## 2025-02-20 NOTE — DATA REVIEWED
[FreeTextEntry1] : 3 views left ankle performed at [facility]: These films independently interpreted by myself.  They are negative for fracture or dislocation.  There is evidence of some mild osteophyte along the talar neck.  There is evidence of an osteochondral lesion of the talus.  There is adequate joint space appreciated in tibiotalar subtalar joint.  No intra-articular loose bodies appreciated.  MR left ankle: Attenuation of atfl Stable 12x6 mm OLT synovitis

## 2025-02-20 NOTE — PHYSICAL EXAM
[Left] : left foot and ankle [5___] : plantar flexion 5[unfilled]/5 [] : no peroneal tendon tenderness [de-identified] : LIMITED ANKLE EXAM: 3 incisions healing well , AM AL portal and lateral incision no evidence of discharge or drainage DF PF -10-40 SILT DP SP T S S able to wiggle toes lachman stable

## 2025-03-07 NOTE — DATA REVIEWED
[FreeTextEntry1] : 3 views left ankle performed at [place]: These films independently interpreted by myself.  They are negative for fracture or dislocation.  There is evidence of some mild osteophyte along the talar neck.  There is evidence of an osteochondral lesion of the talus.  There is adequate joint space appreciated in tibiotalar subtalar joint.  No intra-articular loose bodies appreciated.  MR left ankle: Attenuation of atfl Stable 12x6 mm OLT synovitis

## 2025-03-07 NOTE — DISCUSSION/SUMMARY
[de-identified] : Patient seen and examined.  Patient presents today with chronic acute on chronic left ankle pain.  Based on history, physical examination, imaging I discussed with her that her symptoms are suggestive of some ankle synovitis with associated talar osteophyte as well as a medial osteochondral lesion of the talus.  Due to the fact the patient has not really had any formal treatment to address this issue I recommended initial treatment of cam boot immobilization and nonsteroidal anti-inflammatories compression sock and plantar fascia night splint to help control ankle position.  I will see her back in approximately 2 weeks time.  Should her symptoms fail to improve or worsen we will maintain the cam boot for another 2 weeks and reassess.  Should her symptoms progress appropriately we will initiate transition to ASO and start physical therapy focusing on functional ankle rehabilitation.  Should her symptoms not improve over the next 6 weeks I will order an MRI to look at the extent of the pathology and soft tissue and cartilage to discuss further treatment options either injection or surgery.  Plan for next visit: 1.  Assess tenderness along anterior anterior lateral gutter syndesmosis as well as subfibular impingement.  Assess ankle instability she is currently has a lax anterior drawer.  Consider transitioning out of cam boot if symptoms are improving consider maintaining cam boot if they are not. **** Patient seen and examined.  Patient presents today following up for acute on chronic recurrent left ankle pain with subfibular impingement.  She is doing well on examination.  Her symptoms seem to be much improved.  Swelling is gone down tenderness is gone down.  Ankle feels more stable on examination.  I discussed with her that we will transition to an ASO, and start functional ankle rehabilitation with no passive or active inversion for the next 4 weeks.  I will plan to see her back in approximately 4 weeks time.  She should continue to use either the night splint or the cam boot at night.  No running cutting jumping or box jumps or leg press over the next 4 weeks.  We did discuss exercises I am comfortable with her doing.  Should her symptoms fail to improve or worsen over the next 4 weeks I will order an MRI to look at the extent of pathology and soft tissue and cartilage.  Did discuss further treatment options either injection or surgery.  Plan for next visit: Assess anterior lateral gutter syndesmosis pain as well as subfibular impingement although are improving today.  Assess ankle drawer, currently improving compared to prior examination.  Consider removing restriction on PT versus advanced imaging depending on her symptoms. *** Patient seen and examined.  Patient presents today following up for acute on chronic recurrent left ankle pain with subfibular impingement.  She is doing well on examination. Pt has improving exam but  in the anterior capsule. She has some mild anterior impingement and OLT of medial talar dome that is not tender on exam. Recommend MR so we can discuss further treatment options in the form of either injections biologic or csi were discussed v further PT and NSAIDs. All questions asked and answered. Pt agrees with plan.  *** Patient seen and examined.  Patient presents today for MRI review.  We discussed with her that on her MRI there is evidence of a medial osteochondral lesion of the talus, there is significant ankle synovitis, there is also attenuation of the anterior lateral bundle of the ATFL.  I discussed with her treatment options that included either a low-dose steroid injection to help calm down synovitis, and a PRP injection to do similarly calm down inflammation and help with regenerative potential of intra-articular ligaments as well as any cartilage pathology.  Additionally we did discuss surgical intervention surgical intervention includes an ankle arthroscopy, extensive debridement, assessment under anesthesia of ankle stability, possible Brostrom versus lateral ligament reconstruction, possible debridement of osteochondral lesion of the talus, harvesting B MAC, harvesting bone marrow autograft, placement of bone marrow autograft into the debrided cyst followed by bio cartilage and fibrin glue sealing it.  I discussed with her the possible ability to do these ALCAR as well if she wanted to start with a less invasive surgery to begin with.  We went over the rehab protocol for all with debridement surgery being weightbearing as tolerated within 2 to 3 weeks versus a Brostrom would be around 4 weeks starting physical therapy and brace followed by bio cartilage which would be approximately 6 weeks of nonweightbearing walking in normal shoe around 3 months.  Patient will consider her options and will plan to follow-up with me either to undergo an injection versus surgical discussion.  All questions were asked and answered.  Patient is agreement with the plan we will plan to follow-up accordingly. *** 3/25 Patient seen and examined.  Patient presents today following up for acute on chronic recurrent left ankle pain with subfibular impingement.  She is doing well on examination. Pt received low dose CSI L eft ankle ( did not want PRP, not interested in surgery at this time) tolerated procedure well Continue PT Continue Mobic RTC 6 weeks  next visit: If no improvement rediscuss sx options ** 5/20 Patient seen and examined.  Patient presents today following up for acute on chronic recurrent left ankle pain with subfibular impingement.  She is doing well on examination. Still endorsing pain along medial gutter, but lateral and anterolateral gutter  She received 95-99% pain improvement from injection but it is wearing off Pt cannot have surgery until end of september-- plan for ankle arthroscopy, debridement, repeat MR prior to surgery to assess change in cartilage defect. d/w pt possible repeat low dose csi in 5-6 weeks to help her be active over summer Continue PT Continue Mobic RTC 6 weeks  next visit: possible low dose csi (95% improvement last time) has to push surgery to fall *** 7/1 Patient seen and examined.  Patient presents today following up for acute on chronic recurrent left ankle pain with subfibular impingement.  She is doing well on examination. Still endorsing pain along medial gutter, but lateral and anterolateral gutter  She received 95-99% pain improvement from injection but it is wearing off Pt cannot have surgery until end of september-- plan for ankle arthroscopy, debridement, repeat MR prior to surgery to assess change in cartilage defect. repeated low dose csi to help her be active over summer, pt tolerated procedure well Continue PT Continue Mobic RTC 6 weeks  next visit: r/b of surgery if no improvement **** 11/1 Patient is here for discussion of timeline for surgery. I discussed with the patient that this surgery is completely elective and therefore the timeline is dependent and was most convenient for them.   I discussed with the patient based on their history, physical examination, imaging that her symptoms are suggestive of a symptomatic osteochondral lesion of the talus and possibly osteochondral lesion of the tibia. I discussed with her that she also has a talar osteophyte that is causing anterior impingement as well as a distal tibia osteophyte.    I discussed with her that due to the fact that she has failed nonoperative management my plan for surgery would be an arthroscopic procedure to debride the ankle joint of the synovitis, debridement of the diseased cartilage, assessment of the subchondral bone, if it is soft and there is evidence of cyst removed cystic contents, harvest BMAC and iliac crest bone graft and placement of bone graft into void from cyst removal, on top of that we I will place bio cartilage as well as fibrin glue to seal the defect. Additionally we discussed debriding and cleaning up margins possible microfracture of either the talus or talus and tibia in addition to what was listed above.    As an adjunct to this we also discussed potentially addressing lateral ligament pathology.   We discussed that that is dependent on her symptoms the day of surgery. We discussed the outline for treatment options include either tenosynovectomy, debridement, tubularization, side-to-side tenodesis with excision of the diseased tendon. I discussed with her at length the risks and benefits of surgery. Those include bleeding, neurovascular injury, injury to SPN, CRPS, potential risk of DVT PE and possible loss of life. Additionally we discussed that any cartilage procedure there is always a risk of secondary surgery needed. I discussed with her postoperative protocol that she will be nonweightbearing for approximately 6 weeks. The first 2 weeks will be in the splint followed by 4 weeks in a boot. As long as the incisions look well-healed at 2 weeks we will initiate range of motion to start at 2 weeks postop. Plan for her to start the crutch wean protocol at week 6 followed by the boot wean protocol at week 8. All questions were asked and answered. I would like to see the patient back the week of surgery so we can go over preop visit 1 more time and discuss all risks and benefits and any outstanding questions that were not covered today. Additionally that visit will serve as her preoperative medical clearance due to the fact that she is otherwise healthy denies any chest pain shortness of breath or taking any other medications that would necessitate formal preoperative clearance. *** POV 1 L ankle arthroscopy, debridement, microfx,brostrom incisions healing well , no concern for infection splint removed, cast applied suture removed steristrips applied continue dvt ppx nwb for another 4 weeks RTC 2 weeks  next visit: dc cast transition into boot start PT no wb for 6 weeks total *** pov 2 POV 2 L ankle arthroscopy, debridement, microfx,brostrom incisions healing well , no concern for infection CAST removed, cast applied GENTLE ROM DF PF NO INV EV nwb for another 2 weeks RTC 2 weeks  next visit: CRUTCH WEEN start PT *** POV 3 L ankle arthroscopy, debridement, microfx,brostrom incisions healing well , no concern for infection boot removed, PT rx given no inversion 4 weeks crutch ween given nwb for another 2 weeks RTC 2 weeks  next visit: boot WEEN check rom  ******* 3/7/25: POV 4 L ankle arthroscopy, debridement, microfx, olimpia  Discussed crutch ween 1 week of walking in boot without crutches, will then start boot ween. Whenever she is not in the boot, she should wear the ASO Physical Therapy protocol given Continue working from home remotely RTC 4 weeks  next visit: Will re-evaluate ROM and swelling

## 2025-03-07 NOTE — PHYSICAL EXAM
[de-identified] : LIMITED ANKLE EXAM: 3 incisions healing well , AM AL portal and lateral incision no evidence of discharge or drainage DF PF +5/30 inversion 5 Eversion 5 Strength 5/5 SILT DP SP T S S able to wiggle toes Lachman stable

## 2025-03-07 NOTE — HISTORY OF PRESENT ILLNESS
[de-identified] : Date of Injury/Onset: Patient is here today for evaluation of left ankle. Patient has a history of aggravated fracture in the left ankle from a decade ago. Patient was placed in a cast and physical therapy but states since the original incident she has never been fully healed. Patient has seen other doctors after that and was told she had arthritis. Patient reports persistent swelling and pain after extended periods of walking. Pain: At Rest:  4-5 With Activity: 7 Mechanism of injury: This is NOT a Work Related Injury being treated under Worker's Compensation. This is NOT an athletic injury occurring associated with an interscholastic or organized sports team. Quality of symptoms: Throbbing pain Improves with: Nothing helps Worse with: Walking, physical activity Prior treatment: Cast, Physical Therapy Prior Imaging: X-RAY years ago Reports Available For Review Today: Out of work/sport: No School/Sport/Position/Occupation: [employer] Personal goal: Additional Information: Patient seen and examined.  Patient presents today for evaluation of left ankle pain.  Is acute on chronic.  She states she has an old injury she was diagnosed with some fracture but she is never received formal treatment no surgery no casting she states that she denies any trauma or injury initially.  She noticed her ankle was swollen after a party but states she cannot remember what happened and does not know what happened.  She states pain is localized to the anterior aspect of the ankle as well as into the lateral gutter.  She states that it is significant interfering with her ability perform ADLs or recreational activities.  She has not been seen or evaluated by other docs had any formal treatment, therapy injections or otherwise.  She is here for further evaluation management.  Denies any significant past medical history of diabetes etc.  1/29/24: Left ankle follow up.  Pt reports she feels better.  She states she does not have daily pain with walking.  She states that she feels like she has not tried to push it however she is noticing an improvement with her swelling and pain.  She is here for further evaluation management.  Denies interval trauma or injury.  02/26/2024 Patient is here for follow up of the Left Ankle, states still having aggravating pain on the Left Ankle and states she describes the ASO brace aggravates her Left Ankle more. As well states she is doing Physical Therapy for Left Ankle. Inconsistently compliant with night splint / boot. Ran out of Mobic. State PT has flared it up.    03/14/2024: Pt is here today to follow up on left ankle MRI results. Patient reports no changes in her ankle despite continued PT, patient still experiences significant pain. Pt was unable to  the Mobic.    03/25/2024: Pt is here today to follow up on left ankle. Patient has continued use of ASO and physical therapy but no changes in her ankle since last visit. Patient is interested in receiving an injection today.  05/20/2024 MOMO 40 year F is here today to follow up on left ankle. Patient reports significant improvement since last visit, especially after CSI.   07/01/2024 MOMO 40 year F is here today for follow up evaluation. Pt states that the prior injection is wearing off but that she definitely feels better than she did prior to having. Pt is unable to get surgery sooner than the fall and would like to have an injection today for pain control purposes since she has multiple trips lined up this summer and cannot take the time off to recover.   11/1/24: Patient is here to follow up on her left ankle. No significant improvement since last visit. Here for preop discussion.   01/23/2025 MOMO  is here today for post op visit #1. Patient denies chest pain/ fever after surgery. Pain level (0/10). Patient had been compliant with post op care and NWB. Patient had been taking aspirin.  02/06/2025 MOMO  is here today for post op visit #2. Patient had been taking aspirin and Tylenol for pain relief. Patient had been compliant wiht post op care, NWB, she is ambulating in a wheelchair.   02/20/2025 MOMO is Here today for follow up PO # 3. patient states improvement since last visit. patient is compliant with PO care. NWB is abbulating in a wheelchair.  3/7/25: Patient is here to follow up on her left ankle. 2 months s/p LT ARTHROSCOPY. Attending PT. Reports no pain. Ambulating with cruthces and wearing cam boot as recommended. Reports left knee pain for the last few weeks, applying ice.

## 2025-05-01 NOTE — HISTORY OF PRESENT ILLNESS
[de-identified] : Date of Injury/Onset: Patient is here today for evaluation of left ankle. Patient has a history of aggravated fracture in the left ankle from a decade ago. Patient was placed in a cast and physical therapy but states since the original incident she has never been fully healed. Patient has seen other doctors after that and was told she had arthritis. Patient reports persistent swelling and pain after extended periods of walking. Pain: At Rest:  4-5 With Activity: 7 Mechanism of injury: This is NOT a Work Related Injury being treated under Worker's Compensation. This is NOT an athletic injury occurring associated with an interscholastic or organized sports team. Quality of symptoms: Throbbing pain Improves with: Nothing helps Worse with: Walking, physical activity Prior treatment: Cast, Physical Therapy Prior Imaging: X-RAY years ago Reports Available For Review Today: Out of work/sport: No School/Sport/Position/Occupation: [employer] Personal goal: Additional Information: Patient seen and examined.  Patient presents today for evaluation of left ankle pain.  Is acute on chronic.  She states she has an old injury she was diagnosed with some fracture but she is never received formal treatment no surgery no casting she states that she denies any trauma or injury initially.  She noticed her ankle was swollen after a party but states she cannot remember what happened and does not know what happened.  She states pain is localized to the anterior aspect of the ankle as well as into the lateral gutter.  She states that it is significant interfering with her ability perform ADLs or recreational activities.  She has not been seen or evaluated by other docs had any formal treatment, therapy injections or otherwise.  She is here for further evaluation management.  Denies any significant past medical history of diabetes etc.  1/29/24: Left ankle follow up.  Pt reports she feels better.  She states she does not have daily pain with walking.  She states that she feels like she has not tried to push it however she is noticing an improvement with her swelling and pain.  She is here for further evaluation management.  Denies interval trauma or injury.  02/26/2024 Patient is here for follow up of the Left Ankle, states still having aggravating pain on the Left Ankle and states she describes the ASO brace aggravates her Left Ankle more. As well states she is doing Physical Therapy for Left Ankle. Inconsistently compliant with night splint / boot. Ran out of Mobic. State PT has flared it up.    03/14/2024: Pt is here today to follow up on left ankle MRI results. Patient reports no changes in her ankle despite continued PT, patient still experiences significant pain. Pt was unable to  the Mobic.    03/25/2024: Pt is here today to follow up on left ankle. Patient has continued use of ASO and physical therapy but no changes in her ankle since last visit. Patient is interested in receiving an injection today.  05/20/2024 MOMO 40 year F is here today to follow up on left ankle. Patient reports significant improvement since last visit, especially after CSI.   07/01/2024 MOMO 40 year F is here today for follow up evaluation. Pt states that the prior injection is wearing off but that she definitely feels better than she did prior to having. Pt is unable to get surgery sooner than the fall and would like to have an injection today for pain control purposes since she has multiple trips lined up this summer and cannot take the time off to recover.   11/1/24: Patient is here to follow up on her left ankle. No significant improvement since last visit. Here for preop discussion.   01/23/2025 MOMO  is here today for post op visit #1. Patient denies chest pain/ fever after surgery. Pain level (0/10). Patient had been compliant with post op care and NWB. Patient had been taking aspirin.  02/06/2025 MOMO  is here today for post op visit #2. Patient had been taking aspirin and Tylenol for pain relief. Patient had been compliant wiht post op care, NWB, she is ambulating in a wheelchair.   02/20/2025 MOMO is Here today for follow up PO # 3. patient states improvement since last visit. patient is compliant with PO care. NWB is abbulating in a wheelchair.  3/7/25: Patient is here to follow up on her left ankle. 2 months s/p LT ARTHROSCOPY. Attending PT. Reports no pain. Ambulating with crutches and wearing cam boot as recommended. Reports left knee pain for the last few weeks, applying ice.   4/4/25: Patient presents today to f/up on her left ankle. 3 months s/p LT ankle arthroscopy. Reports no pain, some discomfort. Attending PT 3x/week. Feels some tension from one of incisions and pulling sensation in heel, especially with exercising. Wearing ASO as recommended.   05/01/2025 MOMO  is here today to follow up on s/p left ankle arthroscopy. Patient had been complaint with ASO, reports some soreness and stiffness pain. patient had been doing PT @ [facility] (3X/WK) & [facility].  She had not been wearing cam boot.

## 2025-05-01 NOTE — PHYSICAL EXAM
[de-identified] : LIMITED ANKLE EXAM: 3 incisions healing well , AM AL portal and lateral incision no evidence of discharge or drainage DF PF +10/30 inversion 5/5 Eversion 5/5 Strength 5/5 SILT DP SP T S S able to wiggle toes Lachman stable

## 2025-05-01 NOTE — DATA REVIEWED
[FreeTextEntry1] : 3 views left ankle performed at [place]: These films independently interpreted by myself.  They are negative for fracture or dislocation.  There is evidence of some mild osteophyte along the talar neck.  There is evidence of an osteochondral lesion of the talus.  There is adequate joint space appreciated in tibiotalar subtalar joint.  No intra-articular loose bodies appreciated.  MR left ankle: Attenuation of atfl Stable 12x6 mm OLT synovitis

## 2025-05-01 NOTE — DISCUSSION/SUMMARY
[de-identified] : Patient seen and examined.  Patient presents today with chronic acute on chronic left ankle pain.  Based on history, physical examination, imaging I discussed with her that her symptoms are suggestive of some ankle synovitis with associated talar osteophyte as well as a medial osteochondral lesion of the talus.  Due to the fact the patient has not really had any formal treatment to address this issue I recommended initial treatment of cam boot immobilization and nonsteroidal anti-inflammatories compression sock and plantar fascia night splint to help control ankle position.  I will see her back in approximately 2 weeks time.  Should her symptoms fail to improve or worsen we will maintain the cam boot for another 2 weeks and reassess.  Should her symptoms progress appropriately we will initiate transition to ASO and start physical therapy focusing on functional ankle rehabilitation.  Should her symptoms not improve over the next 6 weeks I will order an MRI to look at the extent of the pathology and soft tissue and cartilage to discuss further treatment options either injection or surgery.  Plan for next visit: 1.  Assess tenderness along anterior anterior lateral gutter syndesmosis as well as subfibular impingement.  Assess ankle instability she is currently has a lax anterior drawer.  Consider transitioning out of cam boot if symptoms are improving consider maintaining cam boot if they are not. **** Patient seen and examined.  Patient presents today following up for acute on chronic recurrent left ankle pain with subfibular impingement.  She is doing well on examination.  Her symptoms seem to be much improved.  Swelling is gone down tenderness is gone down.  Ankle feels more stable on examination.  I discussed with her that we will transition to an ASO, and start functional ankle rehabilitation with no passive or active inversion for the next 4 weeks.  I will plan to see her back in approximately 4 weeks time.  She should continue to use either the night splint or the cam boot at night.  No running cutting jumping or box jumps or leg press over the next 4 weeks.  We did discuss exercises I am comfortable with her doing.  Should her symptoms fail to improve or worsen over the next 4 weeks I will order an MRI to look at the extent of pathology and soft tissue and cartilage.  Did discuss further treatment options either injection or surgery.  Plan for next visit: Assess anterior lateral gutter syndesmosis pain as well as subfibular impingement although are improving today.  Assess ankle drawer, currently improving compared to prior examination.  Consider removing restriction on PT versus advanced imaging depending on her symptoms. *** Patient seen and examined.  Patient presents today following up for acute on chronic recurrent left ankle pain with subfibular impingement.  She is doing well on examination. Pt has improving exam but  in the anterior capsule. She has some mild anterior impingement and OLT of medial talar dome that is not tender on exam. Recommend MR so we can discuss further treatment options in the form of either injections biologic or csi were discussed v further PT and NSAIDs. All questions asked and answered. Pt agrees with plan.  *** Patient seen and examined.  Patient presents today for MRI review.  We discussed with her that on her MRI there is evidence of a medial osteochondral lesion of the talus, there is significant ankle synovitis, there is also attenuation of the anterior lateral bundle of the ATFL.  I discussed with her treatment options that included either a low-dose steroid injection to help calm down synovitis, and a PRP injection to do similarly calm down inflammation and help with regenerative potential of intra-articular ligaments as well as any cartilage pathology.  Additionally we did discuss surgical intervention surgical intervention includes an ankle arthroscopy, extensive debridement, assessment under anesthesia of ankle stability, possible Brostrom versus lateral ligament reconstruction, possible debridement of osteochondral lesion of the talus, harvesting B MAC, harvesting bone marrow autograft, placement of bone marrow autograft into the debrided cyst followed by bio cartilage and fibrin glue sealing it.  I discussed with her the possible ability to do these ALCAR as well if she wanted to start with a less invasive surgery to begin with.  We went over the rehab protocol for all with debridement surgery being weightbearing as tolerated within 2 to 3 weeks versus a Brostrom would be around 4 weeks starting physical therapy and brace followed by bio cartilage which would be approximately 6 weeks of nonweightbearing walking in normal shoe around 3 months.  Patient will consider her options and will plan to follow-up with me either to undergo an injection versus surgical discussion.  All questions were asked and answered.  Patient is agreement with the plan we will plan to follow-up accordingly. *** 3/25 Patient seen and examined.  Patient presents today following up for acute on chronic recurrent left ankle pain with subfibular impingement.  She is doing well on examination. Pt received low dose CSI L eft ankle ( did not want PRP, not interested in surgery at this time) tolerated procedure well Continue PT Continue Mobic RTC 6 weeks  next visit: If no improvement rediscuss sx options ** 5/20 Patient seen and examined.  Patient presents today following up for acute on chronic recurrent left ankle pain with subfibular impingement.  She is doing well on examination. Still endorsing pain along medial gutter, but lateral and anterolateral gutter  She received 95-99% pain improvement from injection but it is wearing off Pt cannot have surgery until end of september-- plan for ankle arthroscopy, debridement, repeat MR prior to surgery to assess change in cartilage defect. d/w pt possible repeat low dose csi in 5-6 weeks to help her be active over summer Continue PT Continue Mobic RTC 6 weeks  next visit: possible low dose csi (95% improvement last time) has to push surgery to fall *** 7/1 Patient seen and examined.  Patient presents today following up for acute on chronic recurrent left ankle pain with subfibular impingement.  She is doing well on examination. Still endorsing pain along medial gutter, but lateral and anterolateral gutter  She received 95-99% pain improvement from injection but it is wearing off Pt cannot have surgery until end of september-- plan for ankle arthroscopy, debridement, repeat MR prior to surgery to assess change in cartilage defect. repeated low dose csi to help her be active over summer, pt tolerated procedure well Continue PT Continue Mobic RTC 6 weeks  next visit: r/b of surgery if no improvement **** 11/1 Patient is here for discussion of timeline for surgery. I discussed with the patient that this surgery is completely elective and therefore the timeline is dependent and was most convenient for them.   I discussed with the patient based on their history, physical examination, imaging that her symptoms are suggestive of a symptomatic osteochondral lesion of the talus and possibly osteochondral lesion of the tibia. I discussed with her that she also has a talar osteophyte that is causing anterior impingement as well as a distal tibia osteophyte.    I discussed with her that due to the fact that she has failed nonoperative management my plan for surgery would be an arthroscopic procedure to debride the ankle joint of the synovitis, debridement of the diseased cartilage, assessment of the subchondral bone, if it is soft and there is evidence of cyst removed cystic contents, harvest BMAC and iliac crest bone graft and placement of bone graft into void from cyst removal, on top of that we I will place bio cartilage as well as fibrin glue to seal the defect. Additionally we discussed debriding and cleaning up margins possible microfracture of either the talus or talus and tibia in addition to what was listed above.    As an adjunct to this we also discussed potentially addressing lateral ligament pathology.   We discussed that that is dependent on her symptoms the day of surgery. We discussed the outline for treatment options include either tenosynovectomy, debridement, tubularization, side-to-side tenodesis with excision of the diseased tendon. I discussed with her at length the risks and benefits of surgery. Those include bleeding, neurovascular injury, injury to SPN, CRPS, potential risk of DVT PE and possible loss of life. Additionally we discussed that any cartilage procedure there is always a risk of secondary surgery needed. I discussed with her postoperative protocol that she will be nonweightbearing for approximately 6 weeks. The first 2 weeks will be in the splint followed by 4 weeks in a boot. As long as the incisions look well-healed at 2 weeks we will initiate range of motion to start at 2 weeks postop. Plan for her to start the crutch wean protocol at week 6 followed by the boot wean protocol at week 8. All questions were asked and answered. I would like to see the patient back the week of surgery so we can go over preop visit 1 more time and discuss all risks and benefits and any outstanding questions that were not covered today. Additionally that visit will serve as her preoperative medical clearance due to the fact that she is otherwise healthy denies any chest pain shortness of breath or taking any other medications that would necessitate formal preoperative clearance. *** POV 1 L ankle arthroscopy, debridement, microfx,brostrom incisions healing well , no concern for infection splint removed, cast applied suture removed steristrips applied continue dvt ppx nwb for another 4 weeks RTC 2 weeks  next visit: dc cast transition into boot start PT no wb for 6 weeks total *** pov 2 POV 2 L ankle arthroscopy, debridement, microfx,brostrom incisions healing well , no concern for infection CAST removed, cast applied GENTLE ROM DF PF NO INV EV nwb for another 2 weeks RTC 2 weeks  next visit: CRUTCH WEEN start PT *** POV 3 L ankle arthroscopy, debridement, microfx,brostrom incisions healing well , no concern for infection boot removed, PT rx given no inversion 4 weeks crutch ween given nwb for another 2 weeks RTC 2 weeks  next visit: boot WEEN check rom  ******* 3/7/25: POV 4 L ankle arthroscopy, debridement, microfx, brostrom  Discussed crutch ween 1 week of walking in boot without crutches, will then start boot ween. Whenever she is not in the boot, she should wear the ASO Physical Therapy protocol given Continue working from home remotely RTC 4 weeks  next visit: Will re-evaluate ROM and swelling *** 4.3.25: POV 4 L ankle arthroscopy, debridement, microfx, brostrom  3 months post op Discussed crutch ween Continue ASO , do not need to sleep in boot Physical Therapy rx renewed Continue working from home remotely RTC 4 weeks  next visit: Will re-evaluate ROM and swelling  **** 5/1/25: 4 months s/p L ankle arthroscopy, debridement, microfx, brostrom Discussed brace ween Continue ASO with activities Avoid wearing high heeled shoes Limited impact activity Physical Therapy rx renewed Continue working from home remotely Discussed hybrid RTW in June 2025 (1 day in office, 4 days remote) RTC 4 weeks  next visit: Will re-evaluate ROM and swelling

## 2025-06-12 NOTE — HISTORY OF PRESENT ILLNESS
[de-identified] : Date of Injury/Onset: Patient is here today for evaluation of left ankle. Patient has a history of aggravated fracture in the left ankle from a decade ago. Patient was placed in a cast and physical therapy but states since the original incident she has never been fully healed. Patient has seen other doctors after that and was told she had arthritis. Patient reports persistent swelling and pain after extended periods of walking. Pain: At Rest:  4-5 With Activity: 7 Mechanism of injury: This is NOT a Work Related Injury being treated under Worker's Compensation. This is NOT an athletic injury occurring associated with an interscholastic or organized sports team. Quality of symptoms: Throbbing pain Improves with: Nothing helps Worse with: Walking, physical activity Prior treatment: Cast, Physical Therapy Prior Imaging: X-RAY years ago Reports Available For Review Today: Out of work/sport: No School/Sport/Position/Occupation: [employer] Personal goal: Additional Information: Patient seen and examined.  Patient presents today for evaluation of left ankle pain.  Is acute on chronic.  She states she has an old injury she was diagnosed with some fracture but she is never received formal treatment no surgery no casting she states that she denies any trauma or injury initially.  She noticed her ankle was swollen after a party but states she cannot remember what happened and does not know what happened.  She states pain is localized to the anterior aspect of the ankle as well as into the lateral gutter.  She states that it is significant interfering with her ability perform ADLs or recreational activities.  She has not been seen or evaluated by other docs had any formal treatment, therapy injections or otherwise.  She is here for further evaluation management.  Denies any significant past medical history of diabetes etc.  1/29/24: Left ankle follow up.  Pt reports she feels better.  She states she does not have daily pain with walking.  She states that she feels like she has not tried to push it however she is noticing an improvement with her swelling and pain.  She is here for further evaluation management.  Denies interval trauma or injury.  02/26/2024 Patient is here for follow up of the Left Ankle, states still having aggravating pain on the Left Ankle and states she describes the ASO brace aggravates her Left Ankle more. As well states she is doing Physical Therapy for Left Ankle. Inconsistently compliant with night splint / boot. Ran out of Mobic. State PT has flared it up.    03/14/2024: Pt is here today to follow up on left ankle MRI results. Patient reports no changes in her ankle despite continued PT, patient still experiences significant pain. Pt was unable to  the Mobic.    03/25/2024: Pt is here today to follow up on left ankle. Patient has continued use of ASO and physical therapy but no changes in her ankle since last visit. Patient is interested in receiving an injection today.  05/20/2024 MOMO 40 year F is here today to follow up on left ankle. Patient reports significant improvement since last visit, especially after CSI.   07/01/2024 MOMO 40 year F is here today for follow up evaluation. Pt states that the prior injection is wearing off but that she definitely feels better than she did prior to having. Pt is unable to get surgery sooner than the fall and would like to have an injection today for pain control purposes since she has multiple trips lined up this summer and cannot take the time off to recover.   11/1/24: Patient is here to follow up on her left ankle. No significant improvement since last visit. Here for preop discussion.   01/23/2025 MOMO  is here today for post op visit #1. Patient denies chest pain/ fever after surgery. Pain level (0/10). Patient had been compliant with post op care and NWB. Patient had been taking aspirin.  02/06/2025 MOMO  is here today for post op visit #2. Patient had been taking aspirin and Tylenol for pain relief. Patient had been compliant wiht post op care, NWB, she is ambulating in a wheelchair.   02/20/2025 MOMO is Here today for follow up PO # 3. patient states improvement since last visit. patient is compliant with PO care. NWB is abbulating in a wheelchair.  3/7/25: Patient is here to follow up on her left ankle. 2 months s/p LT ARTHROSCOPY. Attending PT. Reports no pain. Ambulating with crutches and wearing cam boot as recommended. Reports left knee pain for the last few weeks, applying ice.   4/4/25: Patient presents today to f/up on her left ankle. 3 months s/p LT ankle arthroscopy. Reports no pain, some discomfort. Attending PT 3x/week. Feels some tension from one of incisions and pulling sensation in heel, especially with exercising. Wearing ASO as recommended.   05/01/2025 MOMO  is here today to follow up on s/p left ankle arthroscopy. Patient had been complaint with ASO, reports some soreness and stiffness pain. patient had been doing PT @ [facility] (3X/WK) & [facility].  She had not been wearing cam boot.   06/12/2025 MOMO  is here today to follow up on left ankle. now ~ 6 MONTHS s/p Left ankle arthroscopy. She reports she continues to work with Pt, 3 times per week and has been increasing her activity. She reports pain, stiffness and swelling after activity. She has been wearing ASO bracing as recommended. She continues to apply warm/cold compresses, wearing compression stocking at times.

## 2025-06-12 NOTE — DATA REVIEWED
[FreeTextEntry1] : 3 views left ankle performed at [facility]: These films independently interpreted by myself.  They are negative for fracture or dislocation.  There is evidence of some mild osteophyte along the talar neck.  There is evidence of an osteochondral lesion of the talus.  There is adequate joint space appreciated in tibiotalar subtalar joint.  No intra-articular loose bodies appreciated.  MR left ankle: Attenuation of atfl Stable 12x6 mm OLT synovitis

## 2025-06-12 NOTE — PHYSICAL EXAM
[de-identified] : LIMITED ANKLE EXAM: 3 incisions healing well , AM AL portal and lateral incision no evidence of discharge or drainage DF PF +10/30 inversion 5/5 Eversion 5/5 Strength 5/5 SILT DP SP T S S able to wiggle toes Lachman stable

## 2025-07-24 NOTE — DATA REVIEWED
[FreeTextEntry1] : 3 views left ankle performed at [facility]: These films independently interpreted by myself.  They are negative for fracture or dislocation.  There is evidence of some mild osteophyte along the talar neck.  There is evidence of an osteochondral lesion of the talus.  There is adequate joint space appreciated in tibiotalar subtalar joint.  No intra-articular loose bodies appreciated.  MR left ankle: Attenuation of atfl Stable 12x6 mm OLT synovitis

## 2025-07-24 NOTE — DISCUSSION/SUMMARY
[Medication Risks Reviewed] : Medication risks reviewed [de-identified] : Patient seen and examined.  Patient presents today with chronic acute on chronic left ankle pain.  Based on history, physical examination, imaging I discussed with her that her symptoms are suggestive of some ankle synovitis with associated talar osteophyte as well as a medial osteochondral lesion of the talus.  Due to the fact the patient has not really had any formal treatment to address this issue I recommended initial treatment of cam boot immobilization and nonsteroidal anti-inflammatories compression sock and plantar fascia night splint to help control ankle position.  I will see her back in approximately 2 weeks time.  Should her symptoms fail to improve or worsen we will maintain the cam boot for another 2 weeks and reassess.  Should her symptoms progress appropriately we will initiate transition to ASO and start physical therapy focusing on functional ankle rehabilitation.  Should her symptoms not improve over the next 6 weeks I will order an MRI to look at the extent of the pathology and soft tissue and cartilage to discuss further treatment options either injection or surgery.  Plan for next visit: 1.  Assess tenderness along anterior anterior lateral gutter syndesmosis as well as subfibular impingement.  Assess ankle instability she is currently has a lax anterior drawer.  Consider transitioning out of cam boot if symptoms are improving consider maintaining cam boot if they are not. **** Patient seen and examined.  Patient presents today following up for acute on chronic recurrent left ankle pain with subfibular impingement.  She is doing well on examination.  Her symptoms seem to be much improved.  Swelling is gone down tenderness is gone down.  Ankle feels more stable on examination.  I discussed with her that we will transition to an ASO, and start functional ankle rehabilitation with no passive or active inversion for the next 4 weeks.  I will plan to see her back in approximately 4 weeks time.  She should continue to use either the night splint or the cam boot at night.  No running cutting jumping or box jumps or leg press over the next 4 weeks.  We did discuss exercises I am comfortable with her doing.  Should her symptoms fail to improve or worsen over the next 4 weeks I will order an MRI to look at the extent of pathology and soft tissue and cartilage.  Did discuss further treatment options either injection or surgery.  Plan for next visit: Assess anterior lateral gutter syndesmosis pain as well as subfibular impingement although are improving today.  Assess ankle drawer, currently improving compared to prior examination.  Consider removing restriction on PT versus advanced imaging depending on her symptoms. *** Patient seen and examined.  Patient presents today following up for acute on chronic recurrent left ankle pain with subfibular impingement.  She is doing well on examination. Pt has improving exam but  in the anterior capsule. She has some mild anterior impingement and OLT of medial talar dome that is not tender on exam. Recommend MR so we can discuss further treatment options in the form of either injections biologic or csi were discussed v further PT and NSAIDs. All questions asked and answered. Pt agrees with plan.  *** Patient seen and examined.  Patient presents today for MRI review.  We discussed with her that on her MRI there is evidence of a medial osteochondral lesion of the talus, there is significant ankle synovitis, there is also attenuation of the anterior lateral bundle of the ATFL.  I discussed with her treatment options that included either a low-dose steroid injection to help calm down synovitis, and a PRP injection to do similarly calm down inflammation and help with regenerative potential of intra-articular ligaments as well as any cartilage pathology.  Additionally we did discuss surgical intervention surgical intervention includes an ankle arthroscopy, extensive debridement, assessment under anesthesia of ankle stability, possible Brostrom versus lateral ligament reconstruction, possible debridement of osteochondral lesion of the talus, harvesting B MAC, harvesting bone marrow autograft, placement of bone marrow autograft into the debrided cyst followed by bio cartilage and fibrin glue sealing it.  I discussed with her the possible ability to do these ALCAR as well if she wanted to start with a less invasive surgery to begin with.  We went over the rehab protocol for all with debridement surgery being weightbearing as tolerated within 2 to 3 weeks versus a Brostrom would be around 4 weeks starting physical therapy and brace followed by bio cartilage which would be approximately 6 weeks of nonweightbearing walking in normal shoe around 3 months.  Patient will consider her options and will plan to follow-up with me either to undergo an injection versus surgical discussion.  All questions were asked and answered.  Patient is agreement with the plan we will plan to follow-up accordingly. *** 3/25 Patient seen and examined.  Patient presents today following up for acute on chronic recurrent left ankle pain with subfibular impingement.  She is doing well on examination. Pt received low dose CSI L eft ankle ( did not want PRP, not interested in surgery at this time) tolerated procedure well Continue PT Continue Mobic RTC 6 weeks  next visit: If no improvement rediscuss sx options ** 5/20 Patient seen and examined.  Patient presents today following up for acute on chronic recurrent left ankle pain with subfibular impingement.  She is doing well on examination. Still endorsing pain along medial gutter, but lateral and anterolateral gutter  She received 95-99% pain improvement from injection but it is wearing off Pt cannot have surgery until end of september-- plan for ankle arthroscopy, debridement, repeat MR prior to surgery to assess change in cartilage defect. d/w pt possible repeat low dose csi in 5-6 weeks to help her be active over summer Continue PT Continue Mobic RTC 6 weeks  next visit: possible low dose csi (95% improvement last time) has to push surgery to fall *** 7/1 Patient seen and examined.  Patient presents today following up for acute on chronic recurrent left ankle pain with subfibular impingement.  She is doing well on examination. Still endorsing pain along medial gutter, but lateral and anterolateral gutter  She received 95-99% pain improvement from injection but it is wearing off Pt cannot have surgery until end of september-- plan for ankle arthroscopy, debridement, repeat MR prior to surgery to assess change in cartilage defect. repeated low dose csi to help her be active over summer, pt tolerated procedure well Continue PT Continue Mobic RTC 6 weeks  next visit: r/b of surgery if no improvement **** 11/1 Patient is here for discussion of timeline for surgery. I discussed with the patient that this surgery is completely elective and therefore the timeline is dependent and was most convenient for them.   I discussed with the patient based on their history, physical examination, imaging that her symptoms are suggestive of a symptomatic osteochondral lesion of the talus and possibly osteochondral lesion of the tibia. I discussed with her that she also has a talar osteophyte that is causing anterior impingement as well as a distal tibia osteophyte.    I discussed with her that due to the fact that she has failed nonoperative management my plan for surgery would be an arthroscopic procedure to debride the ankle joint of the synovitis, debridement of the diseased cartilage, assessment of the subchondral bone, if it is soft and there is evidence of cyst removed cystic contents, harvest BMAC and iliac crest bone graft and placement of bone graft into void from cyst removal, on top of that we I will place bio cartilage as well as fibrin glue to seal the defect. Additionally we discussed debriding and cleaning up margins possible microfracture of either the talus or talus and tibia in addition to what was listed above.    As an adjunct to this we also discussed potentially addressing lateral ligament pathology.   We discussed that that is dependent on her symptoms the day of surgery. We discussed the outline for treatment options include either tenosynovectomy, debridement, tubularization, side-to-side tenodesis with excision of the diseased tendon. I discussed with her at length the risks and benefits of surgery. Those include bleeding, neurovascular injury, injury to SPN, CRPS, potential risk of DVT PE and possible loss of life. Additionally we discussed that any cartilage procedure there is always a risk of secondary surgery needed. I discussed with her postoperative protocol that she will be nonweightbearing for approximately 6 weeks. The first 2 weeks will be in the splint followed by 4 weeks in a boot. As long as the incisions look well-healed at 2 weeks we will initiate range of motion to start at 2 weeks postop. Plan for her to start the crutch wean protocol at week 6 followed by the boot wean protocol at week 8. All questions were asked and answered. I would like to see the patient back the week of surgery so we can go over preop visit 1 more time and discuss all risks and benefits and any outstanding questions that were not covered today. Additionally that visit will serve as her preoperative medical clearance due to the fact that she is otherwise healthy denies any chest pain shortness of breath or taking any other medications that would necessitate formal preoperative clearance. *** POV 1 L ankle arthroscopy, debridement, microfx,brostrom incisions healing well , no concern for infection splint removed, cast applied suture removed steristrips applied continue dvt ppx nwb for another 4 weeks RTC 2 weeks  next visit: dc cast transition into boot start PT no wb for 6 weeks total *** pov 2 POV 2 L ankle arthroscopy, debridement, microfx,brostrom incisions healing well , no concern for infection CAST removed, cast applied GENTLE ROM DF PF NO INV EV nwb for another 2 weeks RTC 2 weeks  next visit: CRUTCH WEEN start PT *** POV 3 L ankle arthroscopy, debridement, microfx,brostrom incisions healing well , no concern for infection boot removed, PT rx given no inversion 4 weeks crutch ween given nwb for another 2 weeks RTC 2 weeks  next visit: boot WEEN check rom  ******* 3/7/25: POV 4 L ankle arthroscopy, debridement, microfx, brostrom  Discussed crutch ween 1 week of walking in boot without crutches, will then start boot ween. Whenever she is not in the boot, she should wear the ASO Physical Therapy protocol given Continue working from home remotely RTC 4 weeks  next visit: Will re-evaluate ROM and swelling *** 4.3.25: POV 4 L ankle arthroscopy, debridement, microfx, brostrom  3 months post op Discussed crutch ween Continue ASO , do not need to sleep in boot Physical Therapy rx renewed Continue working from home remotely RTC 4 weeks  next visit: Will re-evaluate ROM and swelling  **** 5/1/25: 4 months s/p L ankle arthroscopy, debridement, microfx, brostrom Discussed brace ween Continue ASO with activities Avoid wearing high heeled shoes Limited impact activity Physical Therapy rx renewed Continue working from home remotely Discussed hybrid RTW in June 2025 (1 day in office, 4 days remote) RTC 4 weeks  next visit: Will re-evaluate ROM and swelling **** 6.12 5 months s/p L ankle arthroscopy, debridement, microfx, brostrom Discussed brace ween Continue ASO with activities Avoid wearing high heeled shoes Limited impact activity Physical Therapy rx renewed Continue working from home remotely Discussed hybrid RTW in June 2025 (1 day in office, 4 days remote) RTC 4 weeks  next visit: Will re-evaluate ROM and swelling *** 7.24 6 months s/p L ankle arthroscopy, debridement, microfx, brostrom Continue ASO with activities Avoid wearing high heeled shoes Limited impact activity Physical Therapy rx renewed Continue working from home remotely Discussed hybrid RTW in August 8/11 (2 day in office, 3 days remote) RTC 6 weeks  next visit: Will re-evaluate ROM and swelling  (pt feels 70% better from before surgery)

## 2025-07-24 NOTE — PHYSICAL EXAM
[de-identified] : LIMITED ANKLE EXAM: 3 incisions healing well , AM AL portal and lateral incision no evidence of discharge or drainage DF PF +10/30 inversion 5/5 Eversion 5/5 Strength 5/5 SILT DP SP T S S able to wiggle toes Lachman stable

## 2025-07-24 NOTE — HISTORY OF PRESENT ILLNESS
[de-identified] : Date of Injury/Onset: Patient is here today for evaluation of left ankle. Patient has a history of aggravated fracture in the left ankle from a decade ago. Patient was placed in a cast and physical therapy but states since the original incident she has never been fully healed. Patient has seen other doctors after that and was told she had arthritis. Patient reports persistent swelling and pain after extended periods of walking. Pain: At Rest:  4-5 With Activity: 7 Mechanism of injury: This is NOT a Work Related Injury being treated under Worker's Compensation. This is NOT an athletic injury occurring associated with an interscholastic or organized sports team. Quality of symptoms: Throbbing pain Improves with: Nothing helps Worse with: Walking, physical activity Prior treatment: Cast, Physical Therapy Prior Imaging: X-RAY years ago Reports Available For Review Today: Out of work/sport: No School/Sport/Position/Occupation: [employer] Personal goal: Additional Information: Patient seen and examined.  Patient presents today for evaluation of left ankle pain.  Is acute on chronic.  She states she has an old injury she was diagnosed with some fracture but she is never received formal treatment no surgery no casting she states that she denies any trauma or injury initially.  She noticed her ankle was swollen after a party but states she cannot remember what happened and does not know what happened.  She states pain is localized to the anterior aspect of the ankle as well as into the lateral gutter.  She states that it is significant interfering with her ability perform ADLs or recreational activities.  She has not been seen or evaluated by other docs had any formal treatment, therapy injections or otherwise.  She is here for further evaluation management.  Denies any significant past medical history of diabetes etc.  1/29/24: Left ankle follow up.  Pt reports she feels better.  She states she does not have daily pain with walking.  She states that she feels like she has not tried to push it however she is noticing an improvement with her swelling and pain.  She is here for further evaluation management.  Denies interval trauma or injury.  02/26/2024 Patient is here for follow up of the Left Ankle, states still having aggravating pain on the Left Ankle and states she describes the ASO brace aggravates her Left Ankle more. As well states she is doing Physical Therapy for Left Ankle. Inconsistently compliant with night splint / boot. Ran out of Mobic. State PT has flared it up.    03/14/2024: Pt is here today to follow up on left ankle MRI results. Patient reports no changes in her ankle despite continued PT, patient still experiences significant pain. Pt was unable to  the Mobic.    03/25/2024: Pt is here today to follow up on left ankle. Patient has continued use of ASO and physical therapy but no changes in her ankle since last visit. Patient is interested in receiving an injection today.  05/20/2024 MOMO 40 year F is here today to follow up on left ankle. Patient reports significant improvement since last visit, especially after CSI.   07/01/2024 MOMO 40 year F is here today for follow up evaluation. Pt states that the prior injection is wearing off but that she definitely feels better than she did prior to having. Pt is unable to get surgery sooner than the fall and would like to have an injection today for pain control purposes since she has multiple trips lined up this summer and cannot take the time off to recover.   11/1/24: Patient is here to follow up on her left ankle. No significant improvement since last visit. Here for preop discussion.   01/23/2025 MOMO  is here today for post op visit #1. Patient denies chest pain/ fever after surgery. Pain level (0/10). Patient had been compliant with post op care and NWB. Patient had been taking aspirin.  02/06/2025 MOMO  is here today for post op visit #2. Patient had been taking aspirin and Tylenol for pain relief. Patient had been compliant wiht post op care, NWB, she is ambulating in a wheelchair.   02/20/2025 MOMO is Here today for follow up PO # 3. patient states improvement since last visit. patient is compliant with PO care. NWB is abbulating in a wheelchair.  3/7/25: Patient is here to follow up on her left ankle. 2 months s/p LT ARTHROSCOPY. Attending PT. Reports no pain. Ambulating with crutches and wearing cam boot as recommended. Reports left knee pain for the last few weeks, applying ice.   4/4/25: Patient presents today to f/up on her left ankle. 3 months s/p LT ankle arthroscopy. Reports no pain, some discomfort. Attending PT 3x/week. Feels some tension from one of incisions and pulling sensation in heel, especially with exercising. Wearing ASO as recommended.   05/01/2025 MOMO  is here today to follow up on s/p left ankle arthroscopy. Patient had been complaint with ASO, reports some soreness and stiffness pain. patient had been doing PT @ [facility] (3X/WK) & [facility].  She had not been wearing cam boot.   06/12/2025 MOMO  is here today to follow up on left ankle. now ~ 6 MONTHS s/p Left ankle arthroscopy. She reports she continues to work with Pt, 3 times per week and has been increasing her activity. She reports pain, stiffness and swelling after activity. She has been wearing ASO bracing as recommended. She continues to apply warm/cold compresses, wearing compression stocking at times.   07/24/2025 MOMO  is here today to follow up on left ankle now ~ 7 months s/p LT ankle arthroscopy. She had been doing PT (2X/WK) & Compliant with ASO. She reports leg/calf cramping and pain has lessened but she continues to have ankle/foot pain with prolonged activity.